# Patient Record
Sex: MALE | Race: WHITE | Employment: PART TIME | URBAN - METROPOLITAN AREA
[De-identification: names, ages, dates, MRNs, and addresses within clinical notes are randomized per-mention and may not be internally consistent; named-entity substitution may affect disease eponyms.]

---

## 2019-08-12 ENCOUNTER — HOSPITAL ENCOUNTER (OUTPATIENT)
Age: 29
Discharge: HOME | End: 2019-08-12

## 2022-03-16 NOTE — PROGRESS NOTES
HPI: Keanu Hilton (: 1990) is a 33 y.o. male, patient, here for evaluation of the following chief complaint(s):    Wrist Pain (Left wrist is not bad , pain is just annoying , certain motion cause him to feel like things in his wrist are get caught )  Patient is seen today to evaluate his left wrist.  Patient is complaining of radial sided left wrist pain for more than a year. He is in the [employer] and when he was last stationed in [place] he received a corticosteroid injection presumably for de Quervain's tenosynovitis. The injection helped slowly with time pain recurred. [facility] that revealed a partial thickness tear of the TFCC involving 40% of the thickness, mild sprain and thickening of the scapholunate ligament, mild osteoarthritis at the radiocarpal, STT and first CMC joints, 8 x 4 x 2 mm volar wrist ganglion cyst and mild flexor carpi radialis tenosynovitis. He denies any numbness or tingling. He is right-hand dominant and had no complaints with his right hand. He is now seen today for further treatment of his left wrist and hand. Vitals:  Ht 5' 10\" (1.778 m)   Wt 190 lb (86.2 kg)   BMI 27.26 kg/m²    Body mass index is 27.26 kg/m². Allergies   Allergen Reactions    Amoxicillin Rash       No current outpatient medications on file. No current facility-administered medications for this visit. Past Medical History:   Diagnosis Date    Arthritis         History reviewed. No pertinent surgical history. History reviewed. No pertinent family history. Social History     Tobacco Use    Smoking status: Never Smoker    Smokeless tobacco: Never Used   Vaping Use    Vaping Use: Never used   Substance Use Topics    Alcohol use: Yes     Comment: occasionally    Drug use: Never        Review of Systems   All other systems reviewed and are negative. Physical Exam    Patient demonstrated good elbow, forearm, wrist and digital range of motion.   No visible cyst.  He has a positive Finkelstein test with swelling to the first dorsal compartment. He will barely ulnarly deviate due to the pain whereas on the right side he has full ulnar and radial deviation of his wrist.  No pain with grind testing of the basal joint. Good digital range of motion sensation refill in both hands. Imaging:    No new x-rays today. MRI Results (most recent):  No results found for this or any previous visit. MRI was reviewed outside study from [facility] on 2/21/2022. This appears to show normal osseous and joint space findings with intact TFCC, scapholunate and lunotriquetral ligaments. No fracture seen. Potential small volar radial ganglion but no advanced arthritis seen. The MRI report read as follows #1 partial-thickness tear of the TFCC #2 mild sprain of the scapholunate ligament #3 mild osteoarthritis #4 volar ganglion cyst along the distal radius 8 x 4 x 2 mm arising at the articulation of the radius, scaphoid and lunate and #5 mild flexor carpi radialis tenosynovitis. ASSESSMENT/PLAN:  Below is the assessment and plan developed based on review of pertinent history, physical exam, labs, studies, and medications. Patient's examination was clinically consistent with left wrist de Quervain's tenosynovitis and a volar radial wrist ganglion cyst.  He has had injection therapy when stationed in [place] and has had pain for 1 to 1-1/2 years. I offered him an alternative treatment option which would include a de Quervain's release and excision of the ganglion cyst with a diagnostic wrist arthroscopy to evaluate and inspect the TFCC partial-thickness tear and thickening of the scapholunate ligament. I reviewed risks that include but not limited to stiffness, pain, nerve or tendon damage and cyst recurrence as well as incomplete relief of pain. Arrangements may be made for this to be performed on an outpatient basis at his convenience.     1. Left wrist pain  2. Ganglion cyst of volar aspect of left wrist  3. De Quervain's tenosynovitis, left  4. Injury of triangular fibrocartilage complex (TFCC) of left wrist, initial encounter      Return for Follow-up 7 to 10 days after surgery. .    An electronic signature was used to authenticate this note.   -- Miriam Suazo MD

## 2022-03-17 ENCOUNTER — OFFICE VISIT (OUTPATIENT)
Dept: ORTHOPEDIC SURGERY | Age: 32
End: 2022-03-17
Payer: OTHER GOVERNMENT

## 2022-03-17 VITALS — WEIGHT: 190 LBS | BODY MASS INDEX: 27.2 KG/M2 | HEIGHT: 70 IN

## 2022-03-17 DIAGNOSIS — M65.4 DE QUERVAIN'S TENOSYNOVITIS, LEFT: ICD-10-CM

## 2022-03-17 DIAGNOSIS — M67.432 GANGLION CYST OF VOLAR ASPECT OF LEFT WRIST: ICD-10-CM

## 2022-03-17 DIAGNOSIS — S69.82XA INJURY OF TRIANGULAR FIBROCARTILAGE COMPLEX (TFCC) OF LEFT WRIST, INITIAL ENCOUNTER: ICD-10-CM

## 2022-03-17 DIAGNOSIS — M25.532 LEFT WRIST PAIN: Primary | ICD-10-CM

## 2022-03-17 PROCEDURE — 99204 OFFICE O/P NEW MOD 45 MIN: CPT | Performed by: ORTHOPAEDIC SURGERY

## 2022-03-17 NOTE — PATIENT INSTRUCTIONS
Ganglions: Care Instructions  Your Care Instructions     A ganglion is a small sac, or cyst, filled with a clear fluid that is like jelly. A ganglion may look like a bump on the hand or wrist. It also can appear on your feet, ankles, knees, or shoulders. It is not cancer. A ganglion can grow out of the protective area, or capsule, around a joint. It also can grow on a tendon sheath, which covers the ropelike tendons that connect muscle to bone. A ganglion may hurt or cause numbness if it presses on a nerve. Many ganglions do not need treatment, and they often go away on their own. But if a ganglion hurts, becomes larger, causes numbness, or limits your activity, your doctor may want to drain it with a needle and syringe or remove it with minor surgery. Follow-up care is a key part of your treatment and safety. Be sure to make and go to all appointments, and call your doctor if you are having problems. It's also a good idea to know your test results and keep a list of the medicines you take. How can you care for yourself at home? · Wear a wrist or finger splint as directed by your doctor. It will keep your wrist or hand from moving and help reduce the fluid in the cyst. This may be all you need for the ganglion to shrink and go away. · Do not smash a ganglion with a book or other heavy object. You may break a bone or otherwise injure your wrist by trying this folk remedy, and the ganglion may return anyway. · Do not try to drain the fluid by poking the ganglion with a pin or any other sharp object. You could cause an infection. When should you call for help? Call your doctor now or seek immediate medical care if:    · You have signs of infection, such as:  ? Increased pain, swelling, warmth, or redness. ? Red streaks leading from the cyst.  ? Pus draining from the cyst.  ? A fever.    Watch closely for changes in your health, and be sure to contact your doctor if:    · You have increasing pain.     · Your ganglion is getting larger.     · You still have pain or numbness from a ganglion. Where can you learn more? Go to http://www.gray.com/  Enter [id number] in the search box to learn more about \"Ganglions: Care Instructions. \"  Current as of: July 1, 2021               Content Version: 13.2  © 2006-2022 [employer]. Care instructions adapted under license by [email or web address] (which disclaims liability or warranty for this information). If you have questions about a medical condition or this instruction, always ask your healthcare professional. [place] [address] any warranty or liability for your use of this information. Silvia Ba Disease: Exercises  Introduction  Here are some examples of exercises for you to try. The exercises may be suggested for a condition or for rehabilitation. Start each exercise slowly. Ease off the exercises if you start to have pain. You will be told when to start these exercises and which ones will work best for you. How to do the exercises  Thumb lifts    1. Place your hand on a flat surface, with your palm up. 2. Lift your thumb away from your palm to make a \"C\" shape. 3. Hold for about 6 seconds. 4. Repeat 8 to 12 times. Passive thumb MP flexion    1. Hold your hand in front of you, and turn your hand so your little finger faces down and your thumb faces up. (Your hand should be in the position used for shaking someone's hand.) You may also rest your hand on a flat surface. 2. Use the fingers on your other hand to bend your thumb down at the point where your thumb connects to your palm. 3. Hold for at least 15 to 30 seconds. 4. Repeat 2 to 4 times. Finkelstein stretch    1. Hold your arms out in front of you. (Your hand should be in the position used for shaking someone's hand.)  2. Bend your thumb toward your palm.   3. Use your other hand to gently stretch your thumb and wrist downward until you feel the stretch on the thumb side of your wrist.  4. Hold for at least 15 to 30 seconds. 5. Repeat 2 to 4 times. Resisted ulnar deviation    For this exercise, you will need elastic exercise material, such as surgical tubing or Thera-Band. 1. Sit leaning forward with your legs slightly spread and your elbow on your thigh. 2. Grasp one end of the band with your palm down, and step on the other end with the foot opposite the hand holding the band. 3. Slowly bend your wrist sideways and away from your knee. 4. Repeat 8 to 12 times. Follow-up care is a key part of your treatment and safety. Be sure to make and go to all appointments, and call your doctor if you are having problems. It's also a good idea to know your test results and keep a list of the medicines you take. Where can you learn more? Go to http://www.gray.com/  Enter Y423 in the search box to learn more about \"De Quervain's Disease: Exercises. \"  Current as of: July 1, 2021               Content Version: 13.2  © 2740-3781 [employer]. Care instructions adapted under license by [employer] (which disclaims liability or warranty for this information). If you have questions about a medical condition or this instruction, always ask your healthcare professional. [address] any warranty or liability for your use of this information.

## 2022-03-18 DIAGNOSIS — S69.82XA INJURY OF TRIANGULAR FIBROCARTILAGE COMPLEX (TFCC) OF LEFT WRIST, INITIAL ENCOUNTER: ICD-10-CM

## 2022-03-18 DIAGNOSIS — M67.432 GANGLION CYST OF VOLAR ASPECT OF LEFT WRIST: Primary | ICD-10-CM

## 2022-03-18 DIAGNOSIS — M65.4 DE QUERVAIN'S TENOSYNOVITIS, LEFT: ICD-10-CM

## 2022-04-07 DIAGNOSIS — S69.82XA INJURY OF TRIANGULAR FIBROCARTILAGE COMPLEX (TFCC) OF LEFT WRIST, INITIAL ENCOUNTER: ICD-10-CM

## 2022-04-07 DIAGNOSIS — M65.4 DE QUERVAIN'S TENOSYNOVITIS, LEFT: ICD-10-CM

## 2022-04-07 DIAGNOSIS — M67.432 GANGLION CYST OF VOLAR ASPECT OF LEFT WRIST: Primary | ICD-10-CM

## 2022-04-07 RX ORDER — HYDROCODONE BITARTRATE AND ACETAMINOPHEN 5; 325 MG/1; MG/1
1 TABLET ORAL
Qty: 15 TABLET | Refills: 0 | Status: SHIPPED | OUTPATIENT
Start: 2022-04-07 | End: 2022-04-08 | Stop reason: HOSPADM

## 2022-04-08 DIAGNOSIS — S69.82XA INJURY OF TRIANGULAR FIBROCARTILAGE COMPLEX (TFCC) OF LEFT WRIST, INITIAL ENCOUNTER: Primary | ICD-10-CM

## 2022-04-08 RX ORDER — HYDROCODONE BITARTRATE AND ACETAMINOPHEN 5; 325 MG/1; MG/1
1 TABLET ORAL
Qty: 15 TABLET | Refills: 0 | Status: SHIPPED | OUTPATIENT
Start: 2022-04-08 | End: 2022-04-11

## 2022-04-13 NOTE — PROGRESS NOTES
HPI: Hernandez Mason (: 1990) is a 33 y.o. male, patient, here for evaluation of the following chief complaint(s):    Surgical Follow-up (Left wrist arthroscopy with TFC debridement, de Quervain's release and volar ganglion cyst excision on 22.)  Patient is seen today to evaluate his left wrist.  Patient is complaining of radial sided left wrist pain for more than a year. He is in the [employer] and when he was last stationed in [place] he received a corticosteroid injection presumably for de Quervain's tenosynovitis. The injection helped slowly with time pain recurred. [facility] that revealed a partial thickness tear of the TFCC involving 40% of the thickness, mild sprain and thickening of the scapholunate ligament, mild osteoarthritis at the radiocarpal, STT and first CMC joints, 8 x 4 x 2 mm volar wrist ganglion cyst and mild flexor carpi radialis tenosynovitis. He denies any numbness or tingling. He is right-hand dominant and had no complaints with his right hand. He underwent surgery arthroscopy the left wrist with TFC partial tear debridement, left de Quervain's release and left volar wrist ganglion cyst excision on 2022. Vitals: There were no vitals taken for this visit. There is no height or weight on file to calculate BMI. Allergies   Allergen Reactions    Amoxicillin Rash       No current outpatient medications on file. No current facility-administered medications for this visit. Past Medical History:   Diagnosis Date    Arthritis         History reviewed. No pertinent surgical history. History reviewed. No pertinent family history.      Social History     Tobacco Use    Smoking status: Never Smoker    Smokeless tobacco: Never Used   Vaping Use    Vaping Use: Never used   Substance Use Topics    Alcohol use: Yes     Comment: occasionally    Drug use: Never        Review of Systems   All other systems reviewed and are negative. Physical Exam    Overall the surgical wounds are healing well with sutures and Steri-Strips in place. No redness drainage or sign of infection. Good early motion. Imaging:    No new x-rays today. MRI Results (most recent):  Results from Abstract encounter on 04/06/22    MRI UP EXT JT LT WO CONT    MRI was reviewed outside study from [facility] on 2/21/2022. This appears to show normal osseous and joint space findings with intact TFCC, scapholunate and lunotriquetral ligaments. No fracture seen. Potential small volar radial ganglion but no advanced arthritis seen. The MRI report read as follows #1 partial-thickness tear of the TFCC #2 mild sprain of the scapholunate ligament #3 mild osteoarthritis #4 volar ganglion cyst along the distal radius 8 x 4 x 2 mm arising at the articulation of the radius, scaphoid and lunate and #5 mild flexor carpi radialis tenosynovitis. ASSESSMENT/PLAN:  Below is the assessment and plan developed based on review of pertinent history, physical exam, labs, studies, and medications. Patient's examination was clinically consistent with left wrist de Quervain's tenosynovitis and a volar radial wrist ganglion cyst.  He has had injection therapy when stationed in [place] and has had pain for 1 to 1-1/2 years. I offered him an alternative treatment option which would include a de Quervain's release and excision of the ganglion cyst with a diagnostic wrist arthroscopy to evaluate and inspect the TFCC partial-thickness tear and thickening of the scapholunate ligament. He underwent surgery arthroscopy the left wrist with TFC partial tear debridement, left de Quervain's release and left volar wrist ganglion cyst excision on 4/8/2022. He may continue with simple wound care, gentle motion and strength. Follow-up in 3 to 4 weeks. 1. Injury of triangular fibrocartilage complex (TFCC) of left wrist, subsequent encounter  2.  Josseline Davis tenosynovitis, left  3. Ganglion cyst of volar aspect of left wrist  4. Left wrist pain      Return in about 4 weeks (around 5/13/2022). An electronic signature was used to authenticate this note.   -- Joseph Patton MD

## 2022-04-15 ENCOUNTER — OFFICE VISIT (OUTPATIENT)
Dept: ORTHOPEDIC SURGERY | Age: 32
End: 2022-04-15
Payer: OTHER GOVERNMENT

## 2022-04-15 DIAGNOSIS — S69.82XD INJURY OF TRIANGULAR FIBROCARTILAGE COMPLEX (TFCC) OF LEFT WRIST, SUBSEQUENT ENCOUNTER: Primary | ICD-10-CM

## 2022-04-15 DIAGNOSIS — M67.432 GANGLION CYST OF VOLAR ASPECT OF LEFT WRIST: ICD-10-CM

## 2022-04-15 DIAGNOSIS — M65.4 DE QUERVAIN'S TENOSYNOVITIS, LEFT: ICD-10-CM

## 2022-04-15 DIAGNOSIS — M25.532 LEFT WRIST PAIN: ICD-10-CM

## 2022-04-15 PROCEDURE — 99024 POSTOP FOLLOW-UP VISIT: CPT | Performed by: ORTHOPAEDIC SURGERY

## 2022-04-15 NOTE — PATIENT INSTRUCTIONS
Wrist: Exercises  Introduction  Here are some examples of exercises for you to try. The exercises may be suggested for a condition or for rehabilitation. Start each exercise slowly. Ease off the exercises if you start to have pain. You will be told when to start these exercises and which ones will work best for you. How to do the exercises  Prayer stretch    1. Start with your palms together in front of your chest just below your chin. 2. Slowly lower your hands toward your waistline, keeping your hands close to your stomach and your palms together until you feel a mild to moderate stretch under your forearms. 3. Hold for at least 15 to 30 seconds. Repeat 2 to 4 times. Wrist flexor stretch    1. Extend your arm in front of you with your palm up. 2. Bend your wrist, pointing your hand toward the floor. 3. With your other hand, gently bend your wrist farther until you feel a mild to moderate stretch in your forearm. 4. Hold for at least 15 to 30 seconds. Repeat 2 to 4 times. Wrist extensor stretch    1. Repeat steps 1 through 4 of the stretch above, but begin with your extended hand palm down. Follow-up care is a key part of your treatment and safety. Be sure to make and go to all appointments, and call your doctor if you are having problems. It's also a good idea to know your test results and keep a list of the medicines you take. Where can you learn more? Go to http://www.gray.com/  Enter Z598 in the search box to learn more about \"Wrist: Exercises. \"  Current as of: July 1, 2021               Content Version: 13.2  © 2006-2022 [employer]. Care instructions adapted under license by [employer] (which disclaims liability or warranty for this information).  If you have questions about a medical condition or this instruction, always ask your healthcare professional. [place] [address] any warranty or liability for your use of this information.